# Patient Record
Sex: FEMALE | Race: WHITE | URBAN - METROPOLITAN AREA
[De-identification: names, ages, dates, MRNs, and addresses within clinical notes are randomized per-mention and may not be internally consistent; named-entity substitution may affect disease eponyms.]

---

## 2017-09-20 ENCOUNTER — OUTPATIENT (OUTPATIENT)
Dept: OUTPATIENT SERVICES | Facility: HOSPITAL | Age: 47
LOS: 1 days | Discharge: HOME | End: 2017-09-20

## 2017-09-20 DIAGNOSIS — R51 HEADACHE: ICD-10-CM

## 2017-09-20 DIAGNOSIS — E04.1 NONTOXIC SINGLE THYROID NODULE: ICD-10-CM

## 2017-09-20 DIAGNOSIS — E28.9 OVARIAN DYSFUNCTION, UNSPECIFIED: ICD-10-CM

## 2017-10-13 ENCOUNTER — OUTPATIENT (OUTPATIENT)
Dept: OUTPATIENT SERVICES | Facility: HOSPITAL | Age: 47
LOS: 1 days | Discharge: HOME | End: 2017-10-13

## 2017-10-13 DIAGNOSIS — Z00.00 ENCOUNTER FOR GENERAL ADULT MEDICAL EXAMINATION WITHOUT ABNORMAL FINDINGS: ICD-10-CM

## 2017-11-09 ENCOUNTER — OUTPATIENT (OUTPATIENT)
Dept: OUTPATIENT SERVICES | Facility: HOSPITAL | Age: 47
LOS: 1 days | Discharge: HOME | End: 2017-11-09

## 2017-11-10 DIAGNOSIS — G47.33 OBSTRUCTIVE SLEEP APNEA (ADULT) (PEDIATRIC): ICD-10-CM

## 2017-12-11 ENCOUNTER — OUTPATIENT (OUTPATIENT)
Dept: OUTPATIENT SERVICES | Facility: HOSPITAL | Age: 47
LOS: 1 days | Discharge: HOME | End: 2017-12-11

## 2017-12-11 DIAGNOSIS — Z12.31 ENCOUNTER FOR SCREENING MAMMOGRAM FOR MALIGNANT NEOPLASM OF BREAST: ICD-10-CM

## 2017-12-11 DIAGNOSIS — R92.2 INCONCLUSIVE MAMMOGRAM: ICD-10-CM

## 2017-12-21 ENCOUNTER — OUTPATIENT (OUTPATIENT)
Dept: OUTPATIENT SERVICES | Facility: HOSPITAL | Age: 47
LOS: 1 days | Discharge: HOME | End: 2017-12-21

## 2017-12-28 ENCOUNTER — OUTPATIENT (OUTPATIENT)
Dept: OUTPATIENT SERVICES | Facility: HOSPITAL | Age: 47
LOS: 1 days | Discharge: HOME | End: 2017-12-28

## 2017-12-28 DIAGNOSIS — D64.9 ANEMIA, UNSPECIFIED: ICD-10-CM

## 2018-01-04 DIAGNOSIS — N63.20 UNSPECIFIED LUMP IN THE LEFT BREAST, UNSPECIFIED QUADRANT: ICD-10-CM

## 2018-01-04 DIAGNOSIS — N60.32 FIBROSCLEROSIS OF LEFT BREAST: ICD-10-CM

## 2018-01-04 DIAGNOSIS — N60.22 FIBROADENOSIS OF LEFT BREAST: ICD-10-CM

## 2018-01-04 DIAGNOSIS — N62 HYPERTROPHY OF BREAST: ICD-10-CM

## 2018-01-11 ENCOUNTER — EMERGENCY (EMERGENCY)
Facility: HOSPITAL | Age: 48
LOS: 0 days | Discharge: HOME | End: 2018-01-11

## 2018-01-11 DIAGNOSIS — M54.2 CERVICALGIA: ICD-10-CM

## 2018-01-11 DIAGNOSIS — R07.89 OTHER CHEST PAIN: ICD-10-CM
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
## 2018-07-03 ENCOUNTER — OUTPATIENT (OUTPATIENT)
Dept: OUTPATIENT SERVICES | Facility: HOSPITAL | Age: 48
LOS: 1 days | Discharge: HOME | End: 2018-07-03

## 2018-07-03 DIAGNOSIS — R92.8 OTHER ABNORMAL AND INCONCLUSIVE FINDINGS ON DIAGNOSTIC IMAGING OF BREAST: ICD-10-CM

## 2018-09-12 ENCOUNTER — OUTPATIENT (OUTPATIENT)
Dept: OUTPATIENT SERVICES | Facility: HOSPITAL | Age: 48
LOS: 1 days | Discharge: HOME | End: 2018-09-12

## 2018-09-12 DIAGNOSIS — R07.9 CHEST PAIN, UNSPECIFIED: ICD-10-CM

## 2018-09-28 PROBLEM — Z00.00 ENCOUNTER FOR PREVENTIVE HEALTH EXAMINATION: Status: ACTIVE | Noted: 2018-09-28

## 2018-10-03 ENCOUNTER — APPOINTMENT (OUTPATIENT)
Dept: SURGERY | Facility: CLINIC | Age: 48
End: 2018-10-03
Payer: COMMERCIAL

## 2018-10-03 VITALS
BODY MASS INDEX: 43 KG/M2 | HEIGHT: 65.5 IN | DIASTOLIC BLOOD PRESSURE: 74 MMHG | SYSTOLIC BLOOD PRESSURE: 120 MMHG | WEIGHT: 261.2 LBS

## 2018-10-03 PROCEDURE — SI006: CPT

## 2018-11-02 ENCOUNTER — APPOINTMENT (OUTPATIENT)
Dept: SURGERY | Facility: CLINIC | Age: 48
End: 2018-11-02
Payer: COMMERCIAL

## 2018-11-02 VITALS
HEIGHT: 65.5 IN | BODY MASS INDEX: 43.95 KG/M2 | SYSTOLIC BLOOD PRESSURE: 120 MMHG | DIASTOLIC BLOOD PRESSURE: 78 MMHG | WEIGHT: 267 LBS

## 2018-11-02 DIAGNOSIS — Z87.19 PERSONAL HISTORY OF OTHER DISEASES OF THE DIGESTIVE SYSTEM: ICD-10-CM

## 2018-11-02 DIAGNOSIS — Z80.0 FAMILY HISTORY OF MALIGNANT NEOPLASM OF DIGESTIVE ORGANS: ICD-10-CM

## 2018-11-02 DIAGNOSIS — Z87.891 PERSONAL HISTORY OF NICOTINE DEPENDENCE: ICD-10-CM

## 2018-11-02 DIAGNOSIS — Z82.49 FAMILY HISTORY OF ISCHEMIC HEART DISEASE AND OTHER DISEASES OF THE CIRCULATORY SYSTEM: ICD-10-CM

## 2018-11-02 PROCEDURE — 99204 OFFICE O/P NEW MOD 45 MIN: CPT

## 2018-11-02 PROCEDURE — 99244 OFF/OP CNSLTJ NEW/EST MOD 40: CPT

## 2018-11-02 RX ORDER — OMEPRAZOLE 40 MG/1
40 CAPSULE, DELAYED RELEASE ORAL DAILY
Refills: 0 | Status: ACTIVE | COMMUNITY

## 2018-11-09 ENCOUNTER — OUTPATIENT (OUTPATIENT)
Dept: OUTPATIENT SERVICES | Facility: HOSPITAL | Age: 48
LOS: 1 days | Discharge: HOME | End: 2018-11-09

## 2018-11-09 DIAGNOSIS — K21.0 GASTRO-ESOPHAGEAL REFLUX DISEASE WITH ESOPHAGITIS: ICD-10-CM

## 2018-11-19 ENCOUNTER — APPOINTMENT (OUTPATIENT)
Dept: SURGERY | Facility: CLINIC | Age: 48
End: 2018-11-19
Payer: COMMERCIAL

## 2018-11-19 VITALS — HEIGHT: 65.5 IN | BODY MASS INDEX: 44.11 KG/M2 | WEIGHT: 268 LBS

## 2018-11-19 DIAGNOSIS — J32.9 CHRONIC SINUSITIS, UNSPECIFIED: ICD-10-CM

## 2018-11-19 PROCEDURE — 97802 MEDICAL NUTRITION INDIV IN: CPT

## 2018-11-26 ENCOUNTER — OUTPATIENT (OUTPATIENT)
Dept: OUTPATIENT SERVICES | Facility: HOSPITAL | Age: 48
LOS: 1 days | Discharge: HOME | End: 2018-11-26

## 2018-11-26 DIAGNOSIS — Z00.00 ENCOUNTER FOR GENERAL ADULT MEDICAL EXAMINATION WITHOUT ABNORMAL FINDINGS: ICD-10-CM

## 2018-12-17 ENCOUNTER — APPOINTMENT (OUTPATIENT)
Dept: SURGERY | Facility: CLINIC | Age: 48
End: 2018-12-17
Payer: COMMERCIAL

## 2018-12-17 VITALS — HEIGHT: 65.5 IN | WEIGHT: 264.4 LBS | BODY MASS INDEX: 43.52 KG/M2

## 2018-12-17 PROCEDURE — 99212 OFFICE O/P EST SF 10 MIN: CPT

## 2019-01-03 ENCOUNTER — TRANSCRIPTION ENCOUNTER (OUTPATIENT)
Age: 49
End: 2019-01-03

## 2019-01-24 ENCOUNTER — APPOINTMENT (OUTPATIENT)
Dept: SURGERY | Facility: CLINIC | Age: 49
End: 2019-01-24
Payer: COMMERCIAL

## 2019-01-24 VITALS — BODY MASS INDEX: 44.44 KG/M2 | HEIGHT: 65.5 IN | WEIGHT: 270 LBS

## 2019-01-24 PROCEDURE — 99212 OFFICE O/P EST SF 10 MIN: CPT

## 2019-02-19 ENCOUNTER — APPOINTMENT (OUTPATIENT)
Dept: SURGERY | Facility: CLINIC | Age: 49
End: 2019-02-19

## 2019-03-21 ENCOUNTER — APPOINTMENT (OUTPATIENT)
Dept: SURGERY | Facility: CLINIC | Age: 49
End: 2019-03-21
Payer: COMMERCIAL

## 2019-03-21 VITALS — WEIGHT: 256 LBS | BODY MASS INDEX: 42.14 KG/M2 | HEIGHT: 65.5 IN

## 2019-03-21 PROCEDURE — 99212 OFFICE O/P EST SF 10 MIN: CPT

## 2019-04-25 ENCOUNTER — APPOINTMENT (OUTPATIENT)
Dept: SURGERY | Facility: CLINIC | Age: 49
End: 2019-04-25
Payer: COMMERCIAL

## 2019-04-25 VITALS — BODY MASS INDEX: 41.32 KG/M2 | WEIGHT: 251 LBS | HEIGHT: 65.5 IN

## 2019-04-25 DIAGNOSIS — K51.20 ULCERATIVE (CHRONIC) PROCTITIS W/OUT COMPLICATIONS: ICD-10-CM

## 2019-04-25 PROCEDURE — 99212 OFFICE O/P EST SF 10 MIN: CPT

## 2019-05-14 ENCOUNTER — APPOINTMENT (OUTPATIENT)
Dept: SURGERY | Facility: CLINIC | Age: 49
End: 2019-05-14
Payer: COMMERCIAL

## 2019-05-14 VITALS — HEIGHT: 65.5 IN | BODY MASS INDEX: 41.32 KG/M2 | WEIGHT: 251 LBS

## 2019-05-14 DIAGNOSIS — M25.562 PAIN IN LEFT KNEE: ICD-10-CM

## 2019-05-14 DIAGNOSIS — M25.471 EFFUSION, RIGHT ANKLE: ICD-10-CM

## 2019-05-14 DIAGNOSIS — M25.472 EFFUSION, RIGHT ANKLE: ICD-10-CM

## 2019-05-14 DIAGNOSIS — K21.0 GASTRO-ESOPHAGEAL REFLUX DISEASE WITH ESOPHAGITIS: ICD-10-CM

## 2019-05-14 DIAGNOSIS — G47.33 OBSTRUCTIVE SLEEP APNEA (ADULT) (PEDIATRIC): ICD-10-CM

## 2019-05-14 PROCEDURE — 99212 OFFICE O/P EST SF 10 MIN: CPT

## 2019-06-11 ENCOUNTER — APPOINTMENT (OUTPATIENT)
Dept: SURGERY | Facility: CLINIC | Age: 49
End: 2019-06-11

## 2019-07-11 ENCOUNTER — OTHER (OUTPATIENT)
Age: 49
End: 2019-07-11

## 2019-07-24 ENCOUNTER — OTHER (OUTPATIENT)
Age: 49
End: 2019-07-24

## 2019-09-26 ENCOUNTER — OUTPATIENT (OUTPATIENT)
Dept: OUTPATIENT SERVICES | Facility: HOSPITAL | Age: 49
LOS: 1 days | Discharge: HOME | End: 2019-09-26
Payer: COMMERCIAL

## 2019-09-26 DIAGNOSIS — Z12.31 ENCOUNTER FOR SCREENING MAMMOGRAM FOR MALIGNANT NEOPLASM OF BREAST: ICD-10-CM

## 2019-09-26 PROCEDURE — 77063 BREAST TOMOSYNTHESIS BI: CPT | Mod: 26

## 2019-09-26 PROCEDURE — 77067 SCR MAMMO BI INCL CAD: CPT | Mod: 26

## 2020-04-26 ENCOUNTER — MESSAGE (OUTPATIENT)
Age: 50
End: 2020-04-26

## 2020-05-05 ENCOUNTER — APPOINTMENT (OUTPATIENT)
Dept: DISASTER EMERGENCY | Facility: CLINIC | Age: 50
End: 2020-05-05

## 2020-05-09 LAB
SARS-COV-2 IGG SERPL IA-ACNC: <0.1 INDEX
SARS-COV-2 IGG SERPL QL IA: NEGATIVE

## 2020-07-20 ENCOUNTER — TRANSCRIPTION ENCOUNTER (OUTPATIENT)
Age: 50
End: 2020-07-20

## 2020-08-01 PROBLEM — Z00.00 ENCOUNTER FOR PREVENTIVE HEALTH EXAMINATION: Status: ACTIVE | Noted: 2020-08-01

## 2020-08-04 ENCOUNTER — RESULT REVIEW (OUTPATIENT)
Age: 50
End: 2020-08-04

## 2020-08-04 ENCOUNTER — OUTPATIENT (OUTPATIENT)
Dept: OUTPATIENT SERVICES | Facility: HOSPITAL | Age: 50
LOS: 1 days | Discharge: HOME | End: 2020-08-04
Payer: COMMERCIAL

## 2020-08-04 DIAGNOSIS — M79.671 PAIN IN RIGHT FOOT: ICD-10-CM

## 2020-08-04 PROCEDURE — 73718 MRI LOWER EXTREMITY W/O DYE: CPT | Mod: 26,RT

## 2021-05-14 ENCOUNTER — OUTPATIENT (OUTPATIENT)
Dept: OUTPATIENT SERVICES | Facility: HOSPITAL | Age: 51
LOS: 1 days | Discharge: HOME | End: 2021-05-14
Payer: COMMERCIAL

## 2021-05-14 DIAGNOSIS — Z12.31 ENCOUNTER FOR SCREENING MAMMOGRAM FOR MALIGNANT NEOPLASM OF BREAST: ICD-10-CM

## 2021-05-14 PROCEDURE — 77063 BREAST TOMOSYNTHESIS BI: CPT | Mod: 26

## 2021-05-14 PROCEDURE — 77067 SCR MAMMO BI INCL CAD: CPT | Mod: 26

## 2021-08-04 ENCOUNTER — APPOINTMENT (OUTPATIENT)
Dept: SURGERY | Facility: CLINIC | Age: 51
End: 2021-08-04
Payer: COMMERCIAL

## 2021-08-04 VITALS
SYSTOLIC BLOOD PRESSURE: 124 MMHG | HEART RATE: 92 BPM | BODY MASS INDEX: 44.08 KG/M2 | TEMPERATURE: 97.3 F | DIASTOLIC BLOOD PRESSURE: 82 MMHG | OXYGEN SATURATION: 98 % | HEIGHT: 65.5 IN | WEIGHT: 267.8 LBS

## 2021-08-04 DIAGNOSIS — K21.9 GASTRO-ESOPHAGEAL REFLUX DISEASE W/OUT ESOPHAGITIS: ICD-10-CM

## 2021-08-04 PROCEDURE — 99214 OFFICE O/P EST MOD 30 MIN: CPT

## 2021-08-04 RX ORDER — SUCRALFATE 1 G/10ML
1 SUSPENSION ORAL 4 TIMES DAILY
Refills: 0 | Status: DISCONTINUED | COMMUNITY
End: 2021-08-04

## 2021-08-04 RX ORDER — MESALAMINE 375 MG/1
CAPSULE, EXTENDED RELEASE ORAL DAILY
Refills: 0 | Status: DISCONTINUED | COMMUNITY
End: 2021-08-04

## 2021-08-04 NOTE — ASSESSMENT
[FreeTextEntry1] : 50 y/o female with Class 3 obesity interested in laparoscopic sleeve gastrectomy.

## 2021-08-04 NOTE — HISTORY OF PRESENT ILLNESS
[de-identified] : Ms. LINDA BAUTISTA is a pleasant 51 year year old female who has been struggling with Her weight for many years.    Ms. LINDA BAUTISTA has tried countless diet and exercise programs, but has been unable to lose sufficient weight and keep it off.  She is here today to discuss surgical options for weight loss.\par \par She is frustrated and ready to learn more about bariatric surgery.  She started the program a few years ago but wanted to give diet and exercise another try.  She has lost 15-20 pounds every year for the past 15 years but has been unable to keep it off each time.  \par \par She works as a surgical PA and gets home late 3 nights a week.  She acknowledges that those nights she eats too much too late.  She also knows that her GERD symptoms are a results of her drinking more coffee lately.\par \par She has ulcerative proctitis and is concerned about effects of bariatric surgery, if any, for her with that condition. \par

## 2021-08-04 NOTE — PLAN
[FreeTextEntry1] : Ms. LINDA BAUTISTA is a 51 year year old female with obesity who is interested in undergoing laparoscopic sleeve gastrectomy for weight loss.  We discussed in detail how Bariatric Surgery is a tool to help treat the serious disease of obesity, and that Her compliance with diet, exercise, and follow-up is crucial to Her overall safety and success.  She will require the following pre-operative evaluations and testing:\par \par Lab work\par Psychological evaluation\par Diet history\par Cardiology evaluation\par Pulmonology evaluation, including sleep study\par Gastroenterology evaluation for upper endoscopy\par PMD clearance\par Nutritional evaluation\par Attendance at preoperative support groups\par \par We had a discussion about potential post-operative complications, including but not limited to: bleeding, infection, leak, stricture, blood clots, GERD, problems with anesthesia.  The patient understands and wishes to proceed.\par \par We discussed that smoking of any kind will preclude the patient from being able to have bariatric surgery.\par \par Will speak with her gastroenterologist about her ulcerative proctitis and obtain clearance from him before proceeding, although there should not be any contraindications that I can foresee for the sleeve. \par \par

## 2021-09-13 ENCOUNTER — APPOINTMENT (OUTPATIENT)
Dept: SURGERY | Facility: CLINIC | Age: 51
End: 2021-09-13
Payer: COMMERCIAL

## 2021-09-13 VITALS — BODY MASS INDEX: 44.28 KG/M2 | HEIGHT: 65.5 IN | WEIGHT: 269 LBS

## 2021-09-13 PROCEDURE — ZZZZZ: CPT

## 2021-10-18 ENCOUNTER — APPOINTMENT (OUTPATIENT)
Dept: SURGERY | Facility: CLINIC | Age: 51
End: 2021-10-18
Payer: COMMERCIAL

## 2021-10-18 VITALS — BODY MASS INDEX: 43.62 KG/M2 | HEIGHT: 65.5 IN | WEIGHT: 265 LBS

## 2021-10-18 PROCEDURE — ZZZZZ: CPT

## 2021-11-04 ENCOUNTER — APPOINTMENT (OUTPATIENT)
Dept: SURGERY | Facility: CLINIC | Age: 51
End: 2021-11-04

## 2021-11-10 ENCOUNTER — NON-APPOINTMENT (OUTPATIENT)
Age: 51
End: 2021-11-10

## 2021-11-22 ENCOUNTER — APPOINTMENT (OUTPATIENT)
Dept: SURGERY | Facility: CLINIC | Age: 51
End: 2021-11-22
Payer: COMMERCIAL

## 2021-11-22 ENCOUNTER — NON-APPOINTMENT (OUTPATIENT)
Age: 51
End: 2021-11-22

## 2021-11-22 VITALS — WEIGHT: 267 LBS | BODY MASS INDEX: 43.95 KG/M2 | HEIGHT: 65.5 IN

## 2021-11-22 DIAGNOSIS — E66.01 MORBID (SEVERE) OBESITY DUE TO EXCESS CALORIES: ICD-10-CM

## 2021-11-22 PROCEDURE — ZZZZZ: CPT

## 2021-11-29 ENCOUNTER — APPOINTMENT (OUTPATIENT)
Dept: NEUROPSYCHOLOGY | Facility: CLINIC | Age: 51
End: 2021-11-29

## 2021-12-07 ENCOUNTER — APPOINTMENT (OUTPATIENT)
Dept: NEUROPSYCHOLOGY | Facility: CLINIC | Age: 51
End: 2021-12-07

## 2021-12-07 ENCOUNTER — OUTPATIENT (OUTPATIENT)
Dept: OUTPATIENT SERVICES | Facility: HOSPITAL | Age: 51
LOS: 1 days | Discharge: HOME | End: 2021-12-07

## 2021-12-07 DIAGNOSIS — F50.9 EATING DISORDER, UNSPECIFIED: ICD-10-CM

## 2021-12-08 DIAGNOSIS — F50.9 EATING DISORDER, UNSPECIFIED: ICD-10-CM

## 2022-07-19 ENCOUNTER — OUTPATIENT (OUTPATIENT)
Dept: OUTPATIENT SERVICES | Facility: HOSPITAL | Age: 52
LOS: 1 days | Discharge: HOME | End: 2022-07-19

## 2022-07-19 DIAGNOSIS — Z12.31 ENCOUNTER FOR SCREENING MAMMOGRAM FOR MALIGNANT NEOPLASM OF BREAST: ICD-10-CM

## 2022-07-19 PROCEDURE — 77063 BREAST TOMOSYNTHESIS BI: CPT | Mod: 26

## 2022-07-19 PROCEDURE — 77067 SCR MAMMO BI INCL CAD: CPT | Mod: 26

## 2022-08-15 ENCOUNTER — APPOINTMENT (OUTPATIENT)
Dept: PULMONOLOGY | Facility: CLINIC | Age: 52
End: 2022-08-15

## 2022-08-15 VITALS
OXYGEN SATURATION: 97 % | DIASTOLIC BLOOD PRESSURE: 72 MMHG | WEIGHT: 273 LBS | HEART RATE: 75 BPM | RESPIRATION RATE: 14 BRPM | SYSTOLIC BLOOD PRESSURE: 114 MMHG | HEIGHT: 65.5 IN | BODY MASS INDEX: 44.94 KG/M2

## 2022-08-15 DIAGNOSIS — G47.33 OBSTRUCTIVE SLEEP APNEA (ADULT) (PEDIATRIC): ICD-10-CM

## 2022-08-15 DIAGNOSIS — R06.02 SHORTNESS OF BREATH: ICD-10-CM

## 2022-08-15 PROCEDURE — 99214 OFFICE O/P EST MOD 30 MIN: CPT

## 2022-08-15 NOTE — REASON FOR VISIT
[Follow-Up] : a follow-up visit [[id number]] : Patient was seen very many years ago.  She has a history of obstructive sleep apnea syndrome.  She tried the machine but gave it back not been able to tolerate the CPAP.  CPAP severity was very mild at that point.  She is planning on undergoing bariatric surgery but for many reasons that fell through.  She is now looking to start of the process again and  tentatively has a procedure scheduled for mid October.\par \par Patient had COVID several months ago.  She feels a little rundown and tired because of this.  Seems to be predominantly upper respiratory and never affected her lungs.  She has gained a little bit of weight over the past several years and is leading a relatively sedentary lifestyle.  She feels a little more short of breath than she did several years ago

## 2022-08-15 NOTE — ASSESSMENT
[FreeTextEntry1] : I believe the patient has very mild obstructive sleep apnea.  I do not think she needs repeat sleep testing prior as this level of sleep apnea should not cause postoperative issues.\par I will arrange for a chest x-ray and full pulmonary function testing.  This is more precautionary as I feel her dyspnea is related to weight gain and deconditioning.\par I will review these findings prior to her surgical procedure.\par I believe the patient would be a mild pulmonary risk for the planned bariatric procedure.

## 2022-09-15 ENCOUNTER — RESULT REVIEW (OUTPATIENT)
Age: 52
End: 2022-09-15

## 2022-09-15 ENCOUNTER — OUTPATIENT (OUTPATIENT)
Dept: OUTPATIENT SERVICES | Facility: HOSPITAL | Age: 52
LOS: 1 days | Discharge: HOME | End: 2022-09-15

## 2022-09-15 DIAGNOSIS — R06.00 DYSPNEA, UNSPECIFIED: ICD-10-CM

## 2022-09-15 PROCEDURE — 71046 X-RAY EXAM CHEST 2 VIEWS: CPT | Mod: 26

## 2022-09-20 ENCOUNTER — NON-APPOINTMENT (OUTPATIENT)
Age: 52
End: 2022-09-20

## 2022-09-28 ENCOUNTER — OUTPATIENT (OUTPATIENT)
Dept: OUTPATIENT SERVICES | Facility: HOSPITAL | Age: 52
LOS: 1 days | End: 2022-09-28

## 2022-09-28 ENCOUNTER — APPOINTMENT (OUTPATIENT)
Dept: OPHTHALMOLOGY | Facility: CLINIC | Age: 52
End: 2022-09-28

## 2022-09-30 DIAGNOSIS — H18.459 NODULAR CORNEAL DEGENERATION, UNSPECIFIED EYE: ICD-10-CM

## 2022-10-05 ENCOUNTER — OUTPATIENT (OUTPATIENT)
Dept: OUTPATIENT SERVICES | Facility: HOSPITAL | Age: 52
LOS: 1 days | Discharge: HOME | End: 2022-10-05

## 2022-10-05 DIAGNOSIS — G47.33 OBSTRUCTIVE SLEEP APNEA (ADULT) (PEDIATRIC): ICD-10-CM

## 2022-10-05 DIAGNOSIS — R06.02 SHORTNESS OF BREATH: ICD-10-CM

## 2022-10-05 PROCEDURE — 94060 EVALUATION OF WHEEZING: CPT | Mod: 26

## 2022-10-05 PROCEDURE — 94729 DIFFUSING CAPACITY: CPT | Mod: 26

## 2022-10-05 PROCEDURE — 94727 GAS DIL/WSHOT DETER LNG VOL: CPT | Mod: 26
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
## 2024-04-25 ENCOUNTER — OUTPATIENT (OUTPATIENT)
Dept: OUTPATIENT SERVICES | Facility: HOSPITAL | Age: 54
LOS: 1 days | End: 2024-04-25
Payer: COMMERCIAL

## 2024-04-25 DIAGNOSIS — Z12.31 ENCOUNTER FOR SCREENING MAMMOGRAM FOR MALIGNANT NEOPLASM OF BREAST: ICD-10-CM

## 2024-04-25 PROCEDURE — 77067 SCR MAMMO BI INCL CAD: CPT | Mod: 26

## 2024-04-25 PROCEDURE — 77063 BREAST TOMOSYNTHESIS BI: CPT

## 2024-04-25 PROCEDURE — 77063 BREAST TOMOSYNTHESIS BI: CPT | Mod: 26

## 2024-04-25 PROCEDURE — 77067 SCR MAMMO BI INCL CAD: CPT

## 2024-04-26 DIAGNOSIS — Z12.31 ENCOUNTER FOR SCREENING MAMMOGRAM FOR MALIGNANT NEOPLASM OF BREAST: ICD-10-CM

## 2025-04-30 ENCOUNTER — EMERGENCY (EMERGENCY)
Facility: HOSPITAL | Age: 55
LOS: 0 days | Discharge: HOME | End: 2025-04-30
Attending: EMERGENCY MEDICINE
Payer: COMMERCIAL

## 2025-04-30 VITALS
TEMPERATURE: 98 F | RESPIRATION RATE: 16 BRPM | OXYGEN SATURATION: 98 % | DIASTOLIC BLOOD PRESSURE: 78 MMHG | WEIGHT: 227.08 LBS | SYSTOLIC BLOOD PRESSURE: 113 MMHG | HEART RATE: 83 BPM

## 2025-04-30 DIAGNOSIS — R11.2 NAUSEA WITH VOMITING, UNSPECIFIED: ICD-10-CM

## 2025-04-30 DIAGNOSIS — R10.13 EPIGASTRIC PAIN: ICD-10-CM

## 2025-04-30 DIAGNOSIS — R74.01 ELEVATION OF LEVELS OF LIVER TRANSAMINASE LEVELS: ICD-10-CM

## 2025-04-30 DIAGNOSIS — Z90.3 ACQUIRED ABSENCE OF STOMACH [PART OF]: Chronic | ICD-10-CM

## 2025-04-30 DIAGNOSIS — R50.9 FEVER, UNSPECIFIED: ICD-10-CM

## 2025-04-30 LAB
ALBUMIN SERPL ELPH-MCNC: 4.1 G/DL — SIGNIFICANT CHANGE UP (ref 3.5–5.2)
ALP SERPL-CCNC: 230 U/L — HIGH (ref 30–115)
ALT FLD-CCNC: 159 U/L — HIGH (ref 0–41)
ANION GAP SERPL CALC-SCNC: 11 MMOL/L — SIGNIFICANT CHANGE UP (ref 7–14)
AST SERPL-CCNC: 297 U/L — HIGH (ref 0–41)
BILIRUB SERPL-MCNC: 1.4 MG/DL — HIGH (ref 0.2–1.2)
BUN SERPL-MCNC: 12 MG/DL — SIGNIFICANT CHANGE UP (ref 10–20)
CALCIUM SERPL-MCNC: 8.9 MG/DL — SIGNIFICANT CHANGE UP (ref 8.4–10.5)
CHLORIDE SERPL-SCNC: 103 MMOL/L — SIGNIFICANT CHANGE UP (ref 98–110)
CO2 SERPL-SCNC: 26 MMOL/L — SIGNIFICANT CHANGE UP (ref 17–32)
CREAT SERPL-MCNC: 0.9 MG/DL — SIGNIFICANT CHANGE UP (ref 0.7–1.5)
EGFR: 76 ML/MIN/1.73M2 — SIGNIFICANT CHANGE UP
EGFR: 76 ML/MIN/1.73M2 — SIGNIFICANT CHANGE UP
GLUCOSE SERPL-MCNC: 77 MG/DL — SIGNIFICANT CHANGE UP (ref 70–99)
HCG SERPL QL: NEGATIVE — SIGNIFICANT CHANGE UP
HCT VFR BLD CALC: 50.4 % — HIGH (ref 37–47)
HGB BLD-MCNC: 16 G/DL — SIGNIFICANT CHANGE UP (ref 12–16)
LIDOCAIN IGE QN: 28 U/L — SIGNIFICANT CHANGE UP (ref 7–60)
MCHC RBC-ENTMCNC: 29.6 PG — SIGNIFICANT CHANGE UP (ref 27–31)
MCHC RBC-ENTMCNC: 31.7 G/DL — LOW (ref 32–37)
MCV RBC AUTO: 93.2 FL — SIGNIFICANT CHANGE UP (ref 81–99)
NRBC BLD AUTO-RTO: 0 /100 WBCS — SIGNIFICANT CHANGE UP (ref 0–0)
PLATELET # BLD AUTO: 224 K/UL — SIGNIFICANT CHANGE UP (ref 130–400)
PMV BLD: 10.9 FL — HIGH (ref 7.4–10.4)
POTASSIUM SERPL-MCNC: 4.8 MMOL/L — SIGNIFICANT CHANGE UP (ref 3.5–5)
POTASSIUM SERPL-SCNC: 4.8 MMOL/L — SIGNIFICANT CHANGE UP (ref 3.5–5)
PROT SERPL-MCNC: 6.8 G/DL — SIGNIFICANT CHANGE UP (ref 6–8)
RBC # BLD: 5.41 M/UL — HIGH (ref 4.2–5.4)
RBC # FLD: 14.2 % — SIGNIFICANT CHANGE UP (ref 11.5–14.5)
SODIUM SERPL-SCNC: 140 MMOL/L — SIGNIFICANT CHANGE UP (ref 135–146)
WBC # BLD: 13.34 K/UL — HIGH (ref 4.8–10.8)
WBC # FLD AUTO: 13.34 K/UL — HIGH (ref 4.8–10.8)

## 2025-04-30 PROCEDURE — 36415 COLL VENOUS BLD VENIPUNCTURE: CPT

## 2025-04-30 PROCEDURE — 84703 CHORIONIC GONADOTROPIN ASSAY: CPT

## 2025-04-30 PROCEDURE — 74177 CT ABD & PELVIS W/CONTRAST: CPT | Mod: 26

## 2025-04-30 PROCEDURE — 93010 ELECTROCARDIOGRAM REPORT: CPT

## 2025-04-30 PROCEDURE — 80053 COMPREHEN METABOLIC PANEL: CPT

## 2025-04-30 PROCEDURE — 93005 ELECTROCARDIOGRAM TRACING: CPT

## 2025-04-30 PROCEDURE — 36000 PLACE NEEDLE IN VEIN: CPT | Mod: XU

## 2025-04-30 PROCEDURE — 85027 COMPLETE CBC AUTOMATED: CPT

## 2025-04-30 PROCEDURE — 76705 ECHO EXAM OF ABDOMEN: CPT

## 2025-04-30 PROCEDURE — 74177 CT ABD & PELVIS W/CONTRAST: CPT | Mod: MC

## 2025-04-30 PROCEDURE — 99285 EMERGENCY DEPT VISIT HI MDM: CPT | Mod: 25

## 2025-04-30 PROCEDURE — 76705 ECHO EXAM OF ABDOMEN: CPT | Mod: 26

## 2025-04-30 PROCEDURE — 83690 ASSAY OF LIPASE: CPT

## 2025-04-30 PROCEDURE — 99285 EMERGENCY DEPT VISIT HI MDM: CPT

## 2025-04-30 RX ORDER — AMOXICILLIN AND CLAVULANATE POTASSIUM 500; 125 MG/1; MG/1
1 TABLET, FILM COATED ORAL
Qty: 20 | Refills: 0
Start: 2025-04-30 | End: 2025-05-09

## 2025-04-30 RX ORDER — DIPHENHYDRAMINE HYDROCHLORIDE AND LIDOCAINE HYDROCHLORIDE AND ALUMINUM HYDROXIDE AND MAGNESIUM HYDRO
30 KIT ONCE
Refills: 0 | Status: COMPLETED | OUTPATIENT
Start: 2025-04-30 | End: 2025-04-30

## 2025-04-30 RX ADMIN — DIPHENHYDRAMINE HYDROCHLORIDE AND LIDOCAINE HYDROCHLORIDE AND ALUMINUM HYDROXIDE AND MAGNESIUM HYDRO 30 MILLILITER(S): KIT at 10:05

## 2025-04-30 RX ADMIN — Medication 40 MILLIGRAM(S): at 08:45

## 2025-04-30 NOTE — ED PROVIDER NOTE - CARE PROVIDER_API CALL
Deep Shepard  Surgery  [address] [place] [address], [address]  [place] [address]  Phone: [phone]  Fax: [phone]  Follow Up Time:

## 2025-04-30 NOTE — ED ADULT TRIAGE NOTE - CHIEF COMPLAINT QUOTE
pt in er for complaints of mid epigastric pain that started during the night after eating around 9 pm, pt vomited twice between 3 and 4 am  pt was able to fall asleep, pain returned this morning and has been constant   pt recently started weight loss medication, history of gastric sleeve

## 2025-04-30 NOTE — ED PROVIDER NOTE - NSFOLLOWUPINSTRUCTIONS_ED_ALL_ED_FT
SEE YOUR DOCTOR IN THE NEXT 24-48 HOURS   RETURN FOR ANY FURTHER CONCERNS     Abdominal Pain    Many things can cause abdominal pain. Many times, abdominal pain is not caused by a disease and will improve without treatment. Your health care provider will do a physical exam to determine if there is a dangerous cause of your pain; blood tests and imaging may help determine the cause of your pain. However, in many cases, no cause may be found and you may need further testing as an outpatient. Monitor your abdominal pain for any changes.     SEEK IMMEDIATE MEDICAL CARE IF YOU HAVE ANY OF THE FOLLOWING SYMPTOMS: worsening abdominal pain, uncontrollable vomiting, profuse diarrhea, inability to have bowel movements or pass gas, black or bloody stools, fever accompanying chest pain or back pain, or fainting. These symptoms may represent a serious problem that is an emergency. Do not wait to see if the symptoms will go away. Get medical help right away. Call 911 and do not drive yourself to the hospital.

## 2025-04-30 NOTE — ED PROVIDER NOTE - PHYSICAL EXAMINATION
VITAL SIGNS: I have reviewed nursing notes and confirm.  CONSTITUTIONAL: Well-developed; well-nourished  SKIN: skin exam is warm and dry, no acute rash.    HEAD: Normocephalic; atraumatic.  EYES: conjunctiva and sclera clear.  ENT: No nasal discharge; airway clear.  CARD: S1, S2 normal; no murmurs, gallops, or rubs. Regular rate and rhythm.   RESP: No wheezes, rales or rhonchi.  ABD: Normal bowel sounds; soft; non-distended  EXT: Normal ROM.  No clubbing, cyanosis or edema.   NEURO: Alert, oriented, grossly unremarkable

## 2025-04-30 NOTE — ED PROVIDER NOTE - CLINICAL SUMMARY MEDICAL DECISION MAKING FREE TEXT BOX
Patient is a 55-year-old female presents for evaluation of epigastric abdominal pain onset over the past 24 to 36 hours with associated fevers chills vomiting or diarrhea patient happens to be on Zepbound denies chest pain shortness of breath diaphoresis    On physical exam patient is normocephalic atraumatic pupils equal round reactive light accommodation extraocular muscles intact oropharynx clear chest clear to station bilaterally abdomen mild tenderness to palpation epigastric region no guarding no rebound extremities full range of motion radial pulses 2+ pedal pulse 2+ no focal deficits noted    Assessment plan patient presents for evaluation of epigastric abdominal pain we obtained labs patient found to have elevated LFTs at which point we obtained CT abdomen pelvis which reveals evidence of cholecystitis right upper quadrant ultrasound also obtained which is negative I obtain EKG provide pain evaluation not consistent with STEMI no excessive QT prolongation not consistent with arrhythmia in addition patient given IV fluids Pepcid as well as Magic mouthwash initially improved given CT findings consulted surgery patient opted to be discharged for outpatient management we discussed indications to return patient is aware

## 2025-04-30 NOTE — ED ADULT TRIAGE NOTE - BP NONINVASIVE DIASTOLIC (MM HG)
[address] [facility] [phone]  [address] [facility] [address] [facility] [phone]    Pacemaker/Defibrillator Clinic    07/07/21      [address] [facility]  [address] [facility]      Dear [address] [facility]    This letter is to inform you that we received the transmission from your monitor at home that checks your implanted heart device. The next date your monitor will automatically transmit will be 10/26. If your report needs attention we will notify you. Your device and monitor are wireless and most transmit cellularly, but please periodically check your monitor is still plugged in to the electrical outlet. If you still use the telephone land line to send please ensure the connection to the phone [email or web address] is secure. This will help to ensure successful automatic transmissions in the future. Also, the monitor needs to be close to you while sleeping at night. Please be aware that the remote device transmission sites are periodically monitored only during regular business hours during which simultaneous in-office device clinics are being run. If your transmission requires attention, we will contact you as soon as possible. **PLEASE NOTE** that our [facility] policy and processes are changing to ensure a more seamless approach for all parties involved, allowing more time for our nurses to address patient issues and concerns. We will no longer be sending letters for NORMAL remote transmissions. You will be contacted by phone if your transmission requires attention (as previously done), and letters will only be sent regarding monitor disconnections or missed transmissions if you are unable to be reached by phone. Please do not be alarmed by this new process, as we will continue to contact you if your transmission report requires attention. This will be your final \"remote received\" letter.   From this point forward, the [facility] will be utilizing the no news is good news approach. As always, please feel free to contact your nurse with any questions or concerns. Thank you.     [facility] [address]

## 2025-04-30 NOTE — ED PROVIDER NOTE - PATIENT PORTAL LINK FT
You can access the FollowMyHealth Patient Portal offered by [facility] by registering at the following website: [email or web address]. By joining [facility]’s FollowMyHealth portal, you will also be able to view your health information using other applications (apps) compatible with our system.

## 2025-04-30 NOTE — ED PROVIDER NOTE - OBJECTIVE STATEMENT
Patient is 55-year-old female recently started on Zepbound here for evaluation of nausea abdominal discomfort.  Patient denies fever, chills vomiting

## 2025-04-30 NOTE — ED PROVIDER NOTE - ATTENDING APP SHARED VISIT CONTRIBUTION OF CARE
I have personally performed a history and physical exam on this patient and personally directed the management of the patient. Patient is a 55-year-old female presents for evaluation of epigastric abdominal pain onset over the past 24 to 36 hours with associated fevers chills vomiting or diarrhea patient happens to be on Zepbound denies chest pain shortness of breath diaphoresis    On physical exam patient is normocephalic atraumatic pupils equal round reactive light accommodation extraocular muscles intact oropharynx clear chest clear to station bilaterally abdomen mild tenderness to palpation epigastric region no guarding no rebound extremities full range of motion radial pulses 2+ pedal pulse 2+ no focal deficits noted    Assessment plan patient presents for evaluation of epigastric abdominal pain we obtained labs patient found to have elevated LFTs at which point we obtained CT abdomen pelvis which reveals evidence of cholecystitis right upper quadrant ultrasound also obtained which is negative I obtain EKG provide pain evaluation not consistent with STEMI no excessive QT prolongation not consistent with arrhythmia in addition patient given IV fluids Pepcid as well as Magic mouthwash initially improved given CT findings consulted surgery patient opted to be discharged for outpatient management we discussed indications to return patient is aware

## 2025-06-19 ENCOUNTER — INPATIENT (INPATIENT)
Facility: HOSPITAL | Age: 55
LOS: 0 days | Discharge: HOME | DRG: 419 | End: 2025-06-20
Attending: SURGERY | Admitting: SURGERY
Payer: COMMERCIAL

## 2025-06-19 VITALS
RESPIRATION RATE: 18 BRPM | DIASTOLIC BLOOD PRESSURE: 79 MMHG | SYSTOLIC BLOOD PRESSURE: 130 MMHG | OXYGEN SATURATION: 98 % | TEMPERATURE: 98 F | WEIGHT: 225.09 LBS | HEART RATE: 54 BPM

## 2025-06-19 DIAGNOSIS — Z90.3 ACQUIRED ABSENCE OF STOMACH [PART OF]: Chronic | ICD-10-CM

## 2025-06-19 DIAGNOSIS — K80.50 CALCULUS OF BILE DUCT WITHOUT CHOLANGITIS OR CHOLECYSTITIS WITHOUT OBSTRUCTION: ICD-10-CM

## 2025-06-19 LAB
ALBUMIN SERPL ELPH-MCNC: 4.2 G/DL — SIGNIFICANT CHANGE UP (ref 3.5–5.2)
ALP SERPL-CCNC: 173 U/L — HIGH (ref 30–115)
ALT FLD-CCNC: 26 U/L — SIGNIFICANT CHANGE UP (ref 0–41)
ANION GAP SERPL CALC-SCNC: 11 MMOL/L — SIGNIFICANT CHANGE UP (ref 7–14)
ANION GAP SERPL CALC-SCNC: 12 MMOL/L — SIGNIFICANT CHANGE UP (ref 7–14)
AST SERPL-CCNC: 72 U/L — HIGH (ref 0–41)
BASOPHILS # BLD AUTO: 0.06 K/UL — SIGNIFICANT CHANGE UP (ref 0–0.2)
BASOPHILS NFR BLD AUTO: 0.7 % — SIGNIFICANT CHANGE UP (ref 0–2)
BILIRUB SERPL-MCNC: 0.9 MG/DL — SIGNIFICANT CHANGE UP (ref 0.2–1.2)
BUN SERPL-MCNC: 11 MG/DL — SIGNIFICANT CHANGE UP (ref 10–20)
BUN SERPL-MCNC: 14 MG/DL — SIGNIFICANT CHANGE UP (ref 10–20)
CALCIUM SERPL-MCNC: 8.5 MG/DL — SIGNIFICANT CHANGE UP (ref 8.4–10.5)
CALCIUM SERPL-MCNC: 9.1 MG/DL — SIGNIFICANT CHANGE UP (ref 8.4–10.5)
CHLORIDE SERPL-SCNC: 103 MMOL/L — SIGNIFICANT CHANGE UP (ref 98–110)
CHLORIDE SERPL-SCNC: 105 MMOL/L — SIGNIFICANT CHANGE UP (ref 98–110)
CO2 SERPL-SCNC: 24 MMOL/L — SIGNIFICANT CHANGE UP (ref 17–32)
CO2 SERPL-SCNC: 24 MMOL/L — SIGNIFICANT CHANGE UP (ref 17–32)
CREAT SERPL-MCNC: 0.8 MG/DL — SIGNIFICANT CHANGE UP (ref 0.7–1.5)
CREAT SERPL-MCNC: 1 MG/DL — SIGNIFICANT CHANGE UP (ref 0.7–1.5)
EGFR: 67 ML/MIN/1.73M2 — SIGNIFICANT CHANGE UP
EGFR: 67 ML/MIN/1.73M2 — SIGNIFICANT CHANGE UP
EGFR: 87 ML/MIN/1.73M2 — SIGNIFICANT CHANGE UP
EGFR: 87 ML/MIN/1.73M2 — SIGNIFICANT CHANGE UP
EOSINOPHIL # BLD AUTO: 0.09 K/UL — SIGNIFICANT CHANGE UP (ref 0–0.5)
EOSINOPHIL NFR BLD AUTO: 1 % — SIGNIFICANT CHANGE UP (ref 0–6)
GLUCOSE SERPL-MCNC: 83 MG/DL — SIGNIFICANT CHANGE UP (ref 70–99)
GLUCOSE SERPL-MCNC: 91 MG/DL — SIGNIFICANT CHANGE UP (ref 70–99)
HCG SERPL QL: NEGATIVE — SIGNIFICANT CHANGE UP
HCT VFR BLD CALC: 50.1 % — HIGH (ref 34.5–45)
HGB BLD-MCNC: 16 G/DL — HIGH (ref 11.5–15.5)
IMM GRANULOCYTES # BLD AUTO: 0.02 K/UL — SIGNIFICANT CHANGE UP (ref 0–0.07)
IMM GRANULOCYTES NFR BLD AUTO: 0.2 % — SIGNIFICANT CHANGE UP (ref 0–0.9)
LACTATE SERPL-SCNC: 1 MMOL/L — SIGNIFICANT CHANGE UP (ref 0.7–2)
LIDOCAIN IGE QN: 61 U/L — HIGH (ref 7–60)
LYMPHOCYTES # BLD AUTO: 2.49 K/UL — SIGNIFICANT CHANGE UP (ref 1–3.3)
LYMPHOCYTES NFR BLD AUTO: 27.2 % — SIGNIFICANT CHANGE UP (ref 13–44)
MAGNESIUM SERPL-MCNC: 2.4 MG/DL — SIGNIFICANT CHANGE UP (ref 1.8–2.4)
MCHC RBC-ENTMCNC: 29.5 PG — SIGNIFICANT CHANGE UP (ref 27–34)
MCHC RBC-ENTMCNC: 31.9 G/DL — LOW (ref 32–36)
MCV RBC AUTO: 92.4 FL — SIGNIFICANT CHANGE UP (ref 80–100)
MONOCYTES # BLD AUTO: 0.74 K/UL — SIGNIFICANT CHANGE UP (ref 0–0.9)
MONOCYTES NFR BLD AUTO: 8.1 % — SIGNIFICANT CHANGE UP (ref 2–14)
NEUTROPHILS # BLD AUTO: 5.77 K/UL — SIGNIFICANT CHANGE UP (ref 1.8–7.4)
NEUTROPHILS NFR BLD AUTO: 62.8 % — SIGNIFICANT CHANGE UP (ref 43–77)
NRBC # BLD AUTO: 0 K/UL — SIGNIFICANT CHANGE UP (ref 0–0)
NRBC # FLD: 0 K/UL — SIGNIFICANT CHANGE UP (ref 0–0)
NRBC BLD AUTO-RTO: 0 /100 WBCS — SIGNIFICANT CHANGE UP (ref 0–0)
PLATELET # BLD AUTO: 196 K/UL — SIGNIFICANT CHANGE UP (ref 150–400)
PMV BLD: 11.2 FL — SIGNIFICANT CHANGE UP (ref 7–13)
POTASSIUM SERPL-MCNC: 4.7 MMOL/L — SIGNIFICANT CHANGE UP (ref 3.5–5)
POTASSIUM SERPL-MCNC: 6.9 MMOL/L — CRITICAL HIGH (ref 3.5–5)
POTASSIUM SERPL-SCNC: 4.7 MMOL/L — SIGNIFICANT CHANGE UP (ref 3.5–5)
POTASSIUM SERPL-SCNC: 6.9 MMOL/L — CRITICAL HIGH (ref 3.5–5)
PROT SERPL-MCNC: 7.2 G/DL — SIGNIFICANT CHANGE UP (ref 6–8)
RBC # BLD: 5.42 M/UL — HIGH (ref 3.8–5.2)
RBC # FLD: 13.7 % — SIGNIFICANT CHANGE UP (ref 10.3–14.5)
SODIUM SERPL-SCNC: 139 MMOL/L — SIGNIFICANT CHANGE UP (ref 135–146)
SODIUM SERPL-SCNC: 140 MMOL/L — SIGNIFICANT CHANGE UP (ref 135–146)
WBC # BLD: 9.17 K/UL — SIGNIFICANT CHANGE UP (ref 3.8–10.5)
WBC # FLD AUTO: 9.17 K/UL — SIGNIFICANT CHANGE UP (ref 3.8–10.5)

## 2025-06-19 PROCEDURE — 80053 COMPREHEN METABOLIC PANEL: CPT

## 2025-06-19 PROCEDURE — S2900: CPT

## 2025-06-19 PROCEDURE — C1889: CPT

## 2025-06-19 PROCEDURE — 76705 ECHO EXAM OF ABDOMEN: CPT | Mod: 26

## 2025-06-19 PROCEDURE — 93010 ELECTROCARDIOGRAM REPORT: CPT

## 2025-06-19 PROCEDURE — 99285 EMERGENCY DEPT VISIT HI MDM: CPT

## 2025-06-19 PROCEDURE — 85027 COMPLETE CBC AUTOMATED: CPT

## 2025-06-19 PROCEDURE — 36415 COLL VENOUS BLD VENIPUNCTURE: CPT

## 2025-06-19 PROCEDURE — 80048 BASIC METABOLIC PNL TOTAL CA: CPT

## 2025-06-19 PROCEDURE — 88304 TISSUE EXAM BY PATHOLOGIST: CPT

## 2025-06-19 PROCEDURE — 99223 1ST HOSP IP/OBS HIGH 75: CPT | Mod: 57

## 2025-06-19 PROCEDURE — 80076 HEPATIC FUNCTION PANEL: CPT

## 2025-06-19 RX ORDER — MELATONIN 5 MG
3 TABLET ORAL AT BEDTIME
Refills: 0 | Status: DISCONTINUED | OUTPATIENT
Start: 2025-06-19 | End: 2025-06-20

## 2025-06-19 RX ORDER — ONDANSETRON HCL/PF 4 MG/2 ML
4 VIAL (ML) INJECTION EVERY 8 HOURS
Refills: 0 | Status: DISCONTINUED | OUTPATIENT
Start: 2025-06-19 | End: 2025-06-20

## 2025-06-19 RX ORDER — ACETAMINOPHEN 500 MG/5ML
1000 LIQUID (ML) ORAL ONCE
Refills: 0 | Status: DISCONTINUED | OUTPATIENT
Start: 2025-06-19 | End: 2025-06-20

## 2025-06-19 RX ORDER — PIPERACILLIN-TAZO-DEXTROSE,ISO 3.375G/5
3.38 IV SOLUTION, PIGGYBACK PREMIX FROZEN(ML) INTRAVENOUS ONCE
Refills: 0 | Status: COMPLETED | OUTPATIENT
Start: 2025-06-19 | End: 2025-06-19

## 2025-06-19 RX ORDER — PIPERACILLIN-TAZO-DEXTROSE,ISO 3.375G/5
3.38 IV SOLUTION, PIGGYBACK PREMIX FROZEN(ML) INTRAVENOUS EVERY 8 HOURS
Refills: 0 | Status: DISCONTINUED | OUTPATIENT
Start: 2025-06-19 | End: 2025-06-20

## 2025-06-19 RX ORDER — KETOROLAC TROMETHAMINE 30 MG/ML
30 INJECTION, SOLUTION INTRAMUSCULAR; INTRAVENOUS EVERY 8 HOURS
Refills: 0 | Status: DISCONTINUED | OUTPATIENT
Start: 2025-06-19 | End: 2025-06-20

## 2025-06-19 RX ORDER — MAGNESIUM, ALUMINUM HYDROXIDE 200-200 MG
30 TABLET,CHEWABLE ORAL EVERY 4 HOURS
Refills: 0 | Status: DISCONTINUED | OUTPATIENT
Start: 2025-06-19 | End: 2025-06-20

## 2025-06-19 RX ORDER — SODIUM CHLORIDE 9 G/1000ML
1000 INJECTION, SOLUTION INTRAVENOUS
Refills: 0 | Status: DISCONTINUED | OUTPATIENT
Start: 2025-06-19 | End: 2025-06-20

## 2025-06-19 RX ORDER — ACETAMINOPHEN 500 MG/5ML
650 LIQUID (ML) ORAL EVERY 6 HOURS
Refills: 0 | Status: DISCONTINUED | OUTPATIENT
Start: 2025-06-19 | End: 2025-06-20

## 2025-06-19 RX ADMIN — Medication 1000 MILLILITER(S): at 16:32

## 2025-06-19 RX ADMIN — Medication 100 MILLIGRAM(S): at 16:32

## 2025-06-19 RX ADMIN — Medication 200 GRAM(S): at 17:23

## 2025-06-19 RX ADMIN — Medication 25 GRAM(S): at 22:33

## 2025-06-19 NOTE — ED PROVIDER NOTE - PHYSICAL EXAMINATION
general: well appearing, no distress  eyes: clear conjunctiva  cardiac: no murmurs, extrasystole, regular rate, regular rhythm  resp: clear throughout all lung fields, no respiratory distress  abdomen: no CVA tenderness, BS x 4, +epigastric tender, no distention, no rashes, no rebound or guarding  msk: pelvis stable, gait steady  skin: no rashes, swelling, bruising

## 2025-06-19 NOTE — H&P ADULT - NSICDXPASTMEDICALHX_GEN_ALL_CORE_FT
PAST MEDICAL HISTORY:  H/O gastric sleeve     History of biliary colic     History of cholecystitis     History of cholelithiasis

## 2025-06-19 NOTE — H&P ADULT - TIME BILLING
The submitted E/M billing level for this visit reflects the total time spent on the day of the visit including face-to-face time spent with the patient, non-face-to-face review of medical records and relevant information, documentation, and asynchronous communication with the patient after a visit via phone, email, or patients EHR portal after the visit.  The medical records reviewed are either scanned into the chart or reviewed with the patient using a patient's electronic medical record portal for patients with records not available to [facility] via electronic transmission platforms from other institutions and labs.  Time spent counseling and performing coordination of care was also included in determining the appropriate EM billing level.  ?  I have reviewed and verified information regarding the chief complaint and history recorded by the ancillary staff and/or the patient. I have independently reviewed and interpreted tests performed by other physicians and facilities as necessary.  ?  I have discussed with the patient differential diagnosis, reason for auxiliary tests if ordered, risks, benefits, alternatives, and complications of each form of therapy were discussed.

## 2025-06-19 NOTE — H&P ADULT - ASSESSMENT
.  Impression:  Patient is a 56 y/o female with past medical history of  Gastric sleeve 10/2022, Cholelithiasis and Cholecystitis who presents with a 1 hour history of sudden onset of severe epigastric pain and nausea after eating lunch and a piece of cake,    Patient with history of known Cholelithiasis, Cholecystitis and prior biliary colic episodes.  This is her 3rd episode of biliary colic.   Reports recent ER visit at [facility] for epigastric pain and had a CT scan abd/pel was significant for Cholecystitis with mild elevated LFT's.   Pain resolved and eventually discharged from the ER with Augmentin but reports never took the Antibiotics.   Follow up labs done with her PMD 2 days later and showed an increase of LFT's with TB of 5.4 but she remained asymptomatic.  Then had  follow up labs which showed resolution of her LFT's and prompted a recent EUS.  Patient had the EUS and Colonoscopy at [facility] in [place] on Tuesday 6/17/2025.   States had EUS findings of Cholelithiasis only.       Patient now with severe sudden onset of epigastric pain and RUQ Sono finding of Cholelithiasis, possible cholecystitis.          Plan:   # Epigastric pain  # Cholelithiasis  # Biliary Colic  # Possible Cholecystitis  - Admit to surgery service under Dr. Shepard.  - NPO.   - IV hydration.   - Pain medications as needed.     - Monitor WBC count and LFT's.   - Ivabx:  Will start Zosyn.   - Chlorhexidine wash per protocol daily.  - Dr. Shepard states will take for a Robotically assisted Cholecystectomy tomorrow morning since patient ate lunch.        # GI prophylaxis:  - Protonix 40 mg IVP daily.  - Zofran IVP PRN N/V.        # VTE prophylaxis:  - Hold pharmacological VTE prophylaxis secondary to low risk and pending surgery in am.  - Leg exercises and OOB to chair and ambulate with assistance.        .  Impression:  Patient is a 54 y/o female with past medical history of  Gastric sleeve 10/2022, Cholelithiasis and Cholecystitis who presents with a 1 hour history of sudden onset of severe epigastric pain and nausea after eating lunch and a piece of cake,    Patient with history of known Cholelithiasis, Cholecystitis and prior biliary colic episodes.  This is her 3rd episode of biliary colic.   Reports recent ER visit at [facility] for epigastric pain and had a CT scan abd/pel was significant for Cholecystitis with mild elevated LFT's.   Pain resolved and eventually discharged from the ER with Augmentin but reports never took the Antibiotics.   Follow up labs done with her PMD 2 days later and showed an increase of LFT's with TB of 5.4 but remained asymptomatic.  Then had  follow up labs which showed resolution of her LFT's and prompted a recent EUS.  Patient had the EUS and Colonoscopy at [facility] in [place] on Tuesday 6/17/2025.   States had EUS findings of Cholelithiasis only.       Patient now with severe sudden onset of epigastric pain and RUQ Sono finding of Cholelithiasis, possible cholecystitis.          Plan:   # Epigastric pain  # Cholelithiasis  # Biliary Colic  # Possible Cholecystitis  - Admit to surgery service under Dr. Shepard.  - NPO.   - IV hydration.   - Pain medications as needed.     - Monitor WBC count and LFT's.   - Will repeat BMP at 7 pm due to hemolyzed specimen with Potassium of 6.9 in ER.  - Ivabx:  Will start Zosyn.   - Chlorhexidine wash per protocol daily.  - Dr. Shepard states will take for a Robotically assisted Cholecystectomy tomorrow since patient ate lunch.        # GI prophylaxis:  - Protonix 40 mg IVP daily.  - Zofran IVP PRN N/V.        # VTE prophylaxis:  - Hold pharmacological VTE prophylaxis secondary to low risk and pending surgery in am.  - Leg exercises and OOB to chair and ambulate with assistance.

## 2025-06-19 NOTE — H&P ADULT - NSHPLABSRESULTS_GEN_ALL_CORE
.                          16.0   9.17  )-----------( 196      ( 19 Jun 2025 15:40 )               50.1                            US Abdomen Upper Quadrant Right (06.19.25 @ 15:44)     ACC: [id number] EXAM:  US ABDOMEN RT UPR QUADRANT   ORDERED BY: BRANDON MARTINEZ     PROCEDURE DATE:  06/19/2025      INTERPRETATION:  CLINICAL INFORMATION: Right upper quadrant pain.    COMPARISON: April 30, 2025    TECHNIQUE: Sonography of the right upper quadrant.    FINDINGS:  Liver: Within normal limits.  Bile ducts: Normal caliber. Common bile duct measures 6 mm.  Gallbladder: Cholelithiasis. Positive Bennett's sign. No gallbladder wall   thickening.  Pancreas: Visualized portions are within normal limits.  Right kidney: 9.8 cm. No hydronephrosis.  Ascites: None.  IVC: Visualized portions are within normal limits.      IMPRESSION:  Cholelithiasis. Possible cholecystitis. .                            16.0   9.17  )-----------( 196      ( 19 Jun 2025 15:40 )               50.1       06-19    139  |  103  |  14  ----------------------------<  83  6.9[HH]   |  24  |  1.0    Ca    9.1      19 Jun 2025 15:40  Mg     2.4     06-19    TPro  7.2  /  Alb  4.2  /  TBili  0.9  /  DBili  x   /  AST  72[H]  /  ALT  26  /  AlkPhos  173[H]  06-19              Urinalysis Basic - ( 19 Jun 2025 15:40 )    Color: x / Appearance: x / SG: x / pH: x  Gluc: 83 mg/dL / Ketone: x  / Bili: x / Urobili: x   Blood: x / Protein: x / Nitrite: x   Leuk Esterase: x / RBC: x / WBC x   Sq Epi: x / Non Sq Epi: x / Bacteria: x        Lactate Trend  06-19 @ 15:40 Lactate:1.0            US Abdomen Upper Quadrant Right (06.19.25 @ 15:44)     ACC: [id number] EXAM:  US ABDOMEN RT UPR QUADRANT   ORDERED BY: BRANDON MARTINEZ     PROCEDURE DATE:  06/19/2025      INTERPRETATION:  CLINICAL INFORMATION: Right upper quadrant pain.    COMPARISON: April 30, 2025    TECHNIQUE: Sonography of the right upper quadrant.    FINDINGS:  Liver: Within normal limits.  Bile ducts: Normal caliber. Common bile duct measures 6 mm.  Gallbladder: Cholelithiasis. Positive Bennett's sign. No gallbladder wall   thickening.  Pancreas: Visualized portions are within normal limits.  Right kidney: 9.8 cm. No hydronephrosis.  Ascites: None.  IVC: Visualized portions are within normal limits.      IMPRESSION:  Cholelithiasis. Possible cholecystitis.

## 2025-06-19 NOTE — H&P ADULT - NSICDXFAMILYHX_GEN_ALL_CORE_FT
FAMILY HISTORY:  Father  Still living? Unknown  Family hx of colon cancer, Age at diagnosis: Age Unknown  Family hx of prostate cancer, Age at diagnosis: Age Unknown    Mother  Still living? Unknown  Family history of breast cancer, Age at diagnosis: Age Unknown

## 2025-06-19 NOTE — H&P ADULT - NSHPPHYSICALEXAM_GEN_ALL_CORE
PHYSICAL EXAM:    General:  WD, WN female in mild distress from epigastric pain.    Skin:  Warm, dry, good color and turgor, no rash, no pressure ulcers.    Eyes:  PERRLA, EOM intact.    Neck:  No tenderness.    Back:  No spinal tenderness.    Respiratory:  CTA B/L, No wheezes, rales or rhonchi.    Cardiovascular:  S1 & S2, RRR, no murmurs, rubs or gallops.     Gastrointestinal:   Soft, No distention, (+) Bennett's sign, (+) Epigastric pain with palpation,  No rebound, guarding or Peritoneal signs.    Genitourinary:  No suprapubic tenderness,  No CVA tenderness.    Extremities:  Normal rom, no edema, no calf tenderness.    Vascular:  No cyanosis, + Palpable distal pulses, No calf tenderness.     Lymph Nodes:  No lymphadenopathy.    Musculoskeletal:  No joint swelling, Free ROM.    Neurological:   A&Ox4, No focal deficits.

## 2025-06-19 NOTE — H&P ADULT - NS ATTEND AMEND GEN_ALL_CORE FT
I saw and examined the patient.  I reviewed the findings from EUS and the patient's previous history.  I reviewed her labs and imaging personally reviewing her scans.  Most likely she has symptomatic cholelithiasis or early cholecystitis and may have exhibited passage of some biliary sludge.  We will recheck her labs in the morning and decide whether surgery or MRI is warranted although I think that cholecystectomy will be most reasonable considering the current findings.  We had an extensive conversation about the risks and benefits of surgery the process and procedure and typical expectations with regards to pain return to work and the risks and intended benefits.  All her questions were answered.  Pleasure assisting in the care of this nice patient.

## 2025-06-19 NOTE — PATIENT PROFILE ADULT - HAVE YOU EXPERIENCED VIOLENCE OR A TRAUMATIC EVENT?
no PRE-OP DIAGNOSIS:  H/O ileostomy 06-Jun-2024 11:54:15  Andreia Stanley  Rectal cancer 06-Jun-2024 11:54:24  Andreia Stanley

## 2025-06-19 NOTE — ED PROVIDER NOTE - OBJECTIVE STATEMENT
56 y/o female with hx of gastric sleeve, gallstones presents to the ED for evaluation of intermittent biliary colic over past few weeks worsening and increasing in frequency. patient denies any vomiting or diarrhea. no abdominal distention. no dysuria. patient s/p eating fatty meal this am . no cp, or sob

## 2025-06-19 NOTE — H&P ADULT - NSHPREVIEWOFSYSTEMS_GEN_ALL_CORE
REVIEW OF SYSTEMS:    CONSTITUTIONAL:  No weakness, fevers or chills  EYES/ENT:  No visual changes;  No vertigo or throat pain   NECK:  No pain or stiffness  RESPIRATORY:  No cough, wheezing, hemoptysis; No shortness of breath  CARDIOVASCULAR:  No chest pain or palpitations  GASTROINTESTINAL:   (+) Epigastric pain.  (+) Nausea,  No vomiting, or hematemesis;  No diarrhea or constipation.  No melena or hematochezia.  GENITOURINARY:  No dysuria, frequency or hematuria  MUSCULOSKELETAL:  FROM all extremities, normal strength, No calf tenderness  NEUROLOGICAL:  No numbness or weakness  SKIN:  No itching, rashes

## 2025-06-19 NOTE — PATIENT PROFILE ADULT - MONEY FOR FOOD
[FreeTextEntry1] : Ms. Najera is a 73 y/o female with a history of HTN, DM, GERD, eosinophilic asthma, allergies and overweight presenting for pulmonary evaluation following lung surgery which revealed benign carcinoid tumorlets. She is currently with #1 issue restrictive dysfunction post lobectomy - s/p clearance for Breast Surgery (right Mastectomy) - now HOWELL \par                    \par Ms. Najera' SOB is multifactorial due to:\par -overweight / out of shape\par -poor mechanics of breathing\par -asthma/restrictive dysfunction (s/p surgery)\par -emphysema \par -?cardiac\par \par Her chronic cough is multifactorial due to: -(active)\par -asthma (eosinophilic)\par -LPR/reflux\par \par problem 1: asthma (severe persistent)- compliant - (Active) \par -continue Ventolin rescue inhaler 2 inhalations before exercise, Q6H\par -continue Incruse 1 inhalation daily\par -continue Breo Ellipta 200 at 1 inhalation daily \par -continue Singulair 10 mg QHS \par -continue Albuterol via nebulizer PRN q6H- rescript 3/2022\par -Inhaler technique reviewed as well as oral hygiene techniques reviewed with patient. Avoidance of cold air, extremes of temperature, rescue inhaler should be used before exercise. Order of medication reviewed with patient. Recommended use of a cool mist humidifier in the bedroom. \par -Asthma is believed to be caused by inherited (genetic) and environmental factor, but its exact cause is unknown. Asthma may be triggered by allergens, lung infections, or irritants in the air. Asthma triggers are different for each person\par \par Problem 1A: Chronic cough \par -Amitriptyline 10 mg 3 x qd \par Any cough greater than three weeks duration-differential diagnosis includes-asthma, upper airway cough syndrome, post nasal drip syndrome, gastroesophageal reflux, laryngopharyngeal reflux, cardiac disease (congestive heart failure, medicines, effects, etc), medication effects (b-blockers, ace inhibitors, ARBs, glaucoma meds, etc.), smoking, infectious, multifactorial, etc.\par \par Problem 1B restrictive dysfunction\par -"fixed" problem s/p surgery\par \par problem 2: poor mechanics of breathing\par -Recommended to research the Bhargav Montejo and Buteyko breathing techniques \par -Proper breathing techniques were reviewed with an emphasis of exhalation. Patient instructed to breath in for 1 second and out for four seconds. Patient was encouraged to not talk while walking. \par \par problem 3: overweight\par -Weight loss, exercise, and diet control were discussed and are highly encouraged. Treatment options were given such as, aqua therapy, and contacting a nutritionist. Recommended to use the elliptical, stationary bike, less use of treadmill. Mindful eating was explained to the patient Obesity is associated with worsening asthma, shortness of breath, and potential for cardiac disease, diabetes, and other underlying medical conditions. \par \par problem 3A: Poor Balance\par -Given prescription for gait training and balance therapy (STARS)\par \par problem 4: LPR/reflux (controlled) \par -continue Pepcid 40 mg QHS \par -continue Protonix 40 mg QD in the morning \par -Rule of 2s: avoid eating too much, eating too late, eating too spicy, eating two hours before bed\par -Things to avoid including overeating, spicy foods, tight clothing, eating within three hours of bed, this list is not all inclusive. \par -For treatment of reflux, possible options discussed including diet control, H2 blockers, PPIs, as well as coating motility agents discussed as treatment options. Timing of meals and proximity of last meal to sleep were discussed. If symptoms persist, a formal gastrointestinal evaluation is needed.\par \par problem 5: allergy/postnasal drip\par -s/p blood work including: allergen panel food (-), IgE panel (-), asthma panel(-), eosinophil level (-), vitamin D level (low)\par -recommended to use Xlear nasal saline spray \par -continue Clarinex 5 mg QAM \par \par problem 6: carcinoid tumorlets (stable 7/2020) \par -follow up CT scan (last 7/2021) next 7/2022 \par \par problem 7: r/o YRIS\par -recommend Home Sleep Study (virtuox)\par -recommended Silenor 3 mg qHS\par Treatment options discussed including CPAP/BiPAP machine, oral appliance, ProVent therapy, Oxy-Aid by Respitec, new technologies, or positional sleep.Recommended use of the CPAP machine for moderate (AHI >15), moderate to severe (AHI 15-30) and severe patients (AHI > 30). Recommended weight loss which can reduce AHI especially in weight loss of greater than 5% of BMI. Positional sleep is recommended in those with low AHI, low-moderate MBI, and younger age. For severe sleep apnea, the hypoglossal nerve stimulator was recommended as well.\par \par problem 8: eosinophilia- asthma \par -she is a candidate for Nucala injections- Set Up Now \par -The safety and efficacy of Nucala was established in three double-blind, randomized, placebo-controlled trials in patients with severe asthma. Compared to a placebo, patients with severe asthma receiving Nucala had fewer exacerbation requiring hospitalization and/or emergency department visits, and a longer time to first exacerbation.  In addition, patients with severe asthma receiving Nucala experienced greater reductions in their daily maintenance oral corticosteroid dose, while maintaining asthma control compared with patients receiving placebo. Treatment with Nucala did not result in a significant improvement in lung function, as measured by the volume of air exhaled by patients in one second. The most common side effects include: headache, injection site reactions, back pain, weakness, and fatigue; hypersensitivity reactions can occur within hours or days including swelling of the face, mouth, and tongue, fainting, dizziness, hives, breathing problems, and rash; herpes zoster infections have occurred. The drug is a monoclonal antibody that inhibits interleukin -5 which helps regular eosinophils, a type of white blood cell that contributes to asthma. The over-production of eosinophils can cause inflammation in the lungs, increasing the frequency of asthma attacks. Patients must also take other medications, including high dose inhaled corticosteroids and at least one additional asthma drug.\par \par problem 9: low vitamin D\par -s/p blood work to include: CBC, vitamin D, LFTs, HbA1c\par -continue supplemental vitamin D 50,000 every 2 weeks (recheck)\par -Has been associated with asthma exacerbations and increased allergic symptoms. The goal based on recent information is maintaining levels between 50-70 and low normal is 30. Recommended 50,000 units every two weeks to once a month depending on the level. \par \par problem 10: anemia\par -referred to have a GI evaluation and was given the name of Dr. Liban Samuel's office  (recheck)\par \par problem 11: Health Maintenance/COVID19 Precautions: covid vaccine x2 \par - Clean your hands often. Wash your hands often with soap and water for at least 20 seconds, especially after blowing your nose, coughing, or sneezing, or having been in a public place.\par - If soap and water are not available, use a hand  that contains at least 60% alcohol.\par - To the extent possible, avoid touching high-touch surfaces in public places - elevator buttons, door handles, handrails, handshaking with people, etc. Use a tissue or your sleeve to cover your hand or finger if you must touch something.\par - Wash your hands after touching surfaces in public places.\par - Avoid touching your face, nose, eyes, etc.\par - Clean and disinfect your home to remove germs: practice routine cleaning of frequently touched surfaces (for example: tables, doorknobs, light switches, handles, desks, toilets, faucets, sinks & cell phones)\par - Avoid crowds, especially in poorly ventilated spaces. Your risk of exposure to respiratory viruses like COVID-19 may increase in crowded, closed-in settings with little air circulation if there are people in the crowd who are sick. All patients are recommended to practice social distancing and stay at least 6 feet away from others.\par - Avoid all non-essential travel including plane trips, and especially avoid embarking on cruise ships.\par -If COVID-19 is spreading in your community, take extra measures to put distance between yourself and other people to further reduce your risk of being exposed to this new virus.\par -Stay home as much as possible.\par - Consider ways of getting food brought to your house through family, social, or commercial networks\par -Be aware that the virus has been known to live in the air up to 3 hours post exposure, cardboard up to 24 hours post exposure, copper up to 4 hours post exposure, steel and plastic up to 2-3 days post exposure. Risk of transmission from these surfaces are affected by many variables.\par Immune Support Recommendations:\par -OTC Vitamin C 500mg BID \par -OTC Quercetin 250-500mg BID \par -OTC Zinc 75-100mg per day \par -OTC Melatonin 1 or 2 mg a night \par -OTC Vitamin D 1-4000mg per day \par -OTC Tonic Water 8oz per day\par Asthma and COVID19:\par You need to make sure your asthma is under control. This often requires the use of inhaled corticosteroids (and sometimes oral corticosteroids). Inhaled corticosteroids do not likely reduce your immune system’s ability to fight infections, but oral corticosteroids may. It is important to use the steps above to protect yourself to limit your exposure to any respiratory virus. \par \par Problem 12: health maintenance\par - S/p Covid 19 vaccine (Pfizer) x2 \par -s/p influenza vaccine 2021 \par -recommended strep pneumonia vaccines after age 65: Prevnar-13 vaccine, followed by Pneumo vaccine 23 one year following (completed) \par -recommended early intervention for URIs\par -recommended regular osteoporosis evaluations\par -recommended early dermatological evaluations\par -recommended after the age of 50 to the age of 70, colonoscopy every 5 years \par \par F/U in 4 months \par She is encouraged to call with any questions, changes, or concerns.\par \par                              ***************PRE-OP CLEARANCE FOR BREAST SURGERY*********\par -at this point in time there are no absolute pulmonary contraindications to go forward with the planned procedure \par -at the time of surgery s/he should have optimal pain control, incentive spirometry, early ambulation, DVT and GI prophylaxis. 
no

## 2025-06-19 NOTE — H&P ADULT - HISTORY OF PRESENT ILLNESS
Patient is a 56 y/o female with past medical history of  Gastric sleeve 10/2022, Cholelithiasis and Cholecystitis who presents with a 1 hour history of sudden onset of severe epigastric pain and nausea after eating lunch and a piece of cake,    Patient with history of known Cholelithiasis, Cholecystitis and prior biliary colic episodes. This is her 3rd episode of biliary colic.   Reports recent ER visit at [facility] for epigastric pain and had a CT scan abd/pel was significant for Cholecystitis with mild elevated LFT's.   Pain resolved and eventually discharged from the ER with Augmentin but reports never took the Antibiotics.   Follow up labs done with her PMD 2 days later and showed an increase of LFT's with TB of 5.4 but she remained asymptomatic.  Then had  follow up labs which showed resolution of her LFT's and prompted a recent EUS.  Patient had the EUS and Colonoscopy at [facility] in [place] on Tuesday 6/17/2025.   States had EUS findings of Cholelithiasis only.     Patient denies fever, chills, tremors, vomiting, diarrhea, constipation, hematemesis, hematochezia, melena, dysuria, pyuria, hematuria, chest pain or shortness of breath.

## 2025-06-20 ENCOUNTER — TRANSCRIPTION ENCOUNTER (OUTPATIENT)
Age: 55
End: 2025-06-20

## 2025-06-20 ENCOUNTER — RESULT REVIEW (OUTPATIENT)
Age: 55
End: 2025-06-20

## 2025-06-20 VITALS
DIASTOLIC BLOOD PRESSURE: 70 MMHG | HEART RATE: 61 BPM | OXYGEN SATURATION: 99 % | RESPIRATION RATE: 18 BRPM | TEMPERATURE: 97 F | SYSTOLIC BLOOD PRESSURE: 106 MMHG

## 2025-06-20 LAB
ALBUMIN SERPL ELPH-MCNC: 3.7 G/DL — SIGNIFICANT CHANGE UP (ref 3.5–5.2)
ALBUMIN SERPL ELPH-MCNC: 3.8 G/DL — SIGNIFICANT CHANGE UP (ref 3.5–5.2)
ALBUMIN SERPL ELPH-MCNC: 3.9 G/DL — SIGNIFICANT CHANGE UP (ref 3.5–5.2)
ALP SERPL-CCNC: 205 U/L — HIGH (ref 30–115)
ALP SERPL-CCNC: 205 U/L — HIGH (ref 30–115)
ALP SERPL-CCNC: 207 U/L — HIGH (ref 30–115)
ALT FLD-CCNC: 187 U/L — HIGH (ref 0–41)
ALT FLD-CCNC: 187 U/L — HIGH (ref 0–41)
ALT FLD-CCNC: 193 U/L — HIGH (ref 0–41)
ANION GAP SERPL CALC-SCNC: 10 MMOL/L — SIGNIFICANT CHANGE UP (ref 7–14)
ANION GAP SERPL CALC-SCNC: 9 MMOL/L — SIGNIFICANT CHANGE UP (ref 7–14)
AST SERPL-CCNC: 182 U/L — HIGH (ref 0–41)
AST SERPL-CCNC: 189 U/L — HIGH (ref 0–41)
AST SERPL-CCNC: 194 U/L — HIGH (ref 0–41)
BILIRUB DIRECT SERPL-MCNC: 0.2 MG/DL — SIGNIFICANT CHANGE UP (ref 0–0.3)
BILIRUB INDIRECT FLD-MCNC: 0.9 MG/DL — SIGNIFICANT CHANGE UP (ref 0.2–1.2)
BILIRUB SERPL-MCNC: 1.1 MG/DL — SIGNIFICANT CHANGE UP (ref 0.2–1.2)
BILIRUB SERPL-MCNC: 1.1 MG/DL — SIGNIFICANT CHANGE UP (ref 0.2–1.2)
BILIRUB SERPL-MCNC: 1.2 MG/DL — SIGNIFICANT CHANGE UP (ref 0.2–1.2)
BUN SERPL-MCNC: 10 MG/DL — SIGNIFICANT CHANGE UP (ref 10–20)
BUN SERPL-MCNC: 10 MG/DL — SIGNIFICANT CHANGE UP (ref 10–20)
CALCIUM SERPL-MCNC: 8.9 MG/DL — SIGNIFICANT CHANGE UP (ref 8.4–10.5)
CALCIUM SERPL-MCNC: 9.3 MG/DL — SIGNIFICANT CHANGE UP (ref 8.4–10.5)
CHLORIDE SERPL-SCNC: 108 MMOL/L — SIGNIFICANT CHANGE UP (ref 98–110)
CHLORIDE SERPL-SCNC: 109 MMOL/L — SIGNIFICANT CHANGE UP (ref 98–110)
CO2 SERPL-SCNC: 25 MMOL/L — SIGNIFICANT CHANGE UP (ref 17–32)
CO2 SERPL-SCNC: 26 MMOL/L — SIGNIFICANT CHANGE UP (ref 17–32)
CREAT SERPL-MCNC: 1 MG/DL — SIGNIFICANT CHANGE UP (ref 0.7–1.5)
CREAT SERPL-MCNC: 1 MG/DL — SIGNIFICANT CHANGE UP (ref 0.7–1.5)
EGFR: 67 ML/MIN/1.73M2 — SIGNIFICANT CHANGE UP
GLUCOSE SERPL-MCNC: 82 MG/DL — SIGNIFICANT CHANGE UP (ref 70–99)
GLUCOSE SERPL-MCNC: 84 MG/DL — SIGNIFICANT CHANGE UP (ref 70–99)
HCT VFR BLD CALC: 47.7 % — HIGH (ref 34.5–45)
HGB BLD-MCNC: 14.8 G/DL — SIGNIFICANT CHANGE UP (ref 11.5–15.5)
MCHC RBC-ENTMCNC: 28.8 PG — SIGNIFICANT CHANGE UP (ref 27–34)
MCHC RBC-ENTMCNC: 31 G/DL — LOW (ref 32–36)
MCV RBC AUTO: 92.8 FL — SIGNIFICANT CHANGE UP (ref 80–100)
NRBC # BLD AUTO: 0 K/UL — SIGNIFICANT CHANGE UP (ref 0–0)
NRBC # FLD: 0 K/UL — SIGNIFICANT CHANGE UP (ref 0–0)
NRBC BLD AUTO-RTO: 0 /100 WBCS — SIGNIFICANT CHANGE UP (ref 0–0)
PLATELET # BLD AUTO: 190 K/UL — SIGNIFICANT CHANGE UP (ref 150–400)
PMV BLD: 10.5 FL — SIGNIFICANT CHANGE UP (ref 7–13)
POTASSIUM SERPL-MCNC: 4.6 MMOL/L — SIGNIFICANT CHANGE UP (ref 3.5–5)
POTASSIUM SERPL-MCNC: 5.6 MMOL/L — HIGH (ref 3.5–5)
POTASSIUM SERPL-SCNC: 4.6 MMOL/L — SIGNIFICANT CHANGE UP (ref 3.5–5)
POTASSIUM SERPL-SCNC: 5.6 MMOL/L — HIGH (ref 3.5–5)
PROT SERPL-MCNC: 6.1 G/DL — SIGNIFICANT CHANGE UP (ref 6–8)
PROT SERPL-MCNC: 6.1 G/DL — SIGNIFICANT CHANGE UP (ref 6–8)
PROT SERPL-MCNC: 6.4 G/DL — SIGNIFICANT CHANGE UP (ref 6–8)
RBC # BLD: 5.14 M/UL — SIGNIFICANT CHANGE UP (ref 3.8–5.2)
RBC # FLD: 13.8 % — SIGNIFICANT CHANGE UP (ref 10.3–14.5)
SODIUM SERPL-SCNC: 143 MMOL/L — SIGNIFICANT CHANGE UP (ref 135–146)
SODIUM SERPL-SCNC: 144 MMOL/L — SIGNIFICANT CHANGE UP (ref 135–146)
WBC # BLD: 7.26 K/UL — SIGNIFICANT CHANGE UP (ref 3.8–10.5)
WBC # FLD AUTO: 7.26 K/UL — SIGNIFICANT CHANGE UP (ref 3.8–10.5)

## 2025-06-20 PROCEDURE — 88304 TISSUE EXAM BY PATHOLOGIST: CPT | Mod: 26

## 2025-06-20 PROCEDURE — 47562 LAPAROSCOPIC CHOLECYSTECTOMY: CPT

## 2025-06-20 PROCEDURE — S2900 ROBOTIC SURGICAL SYSTEM: CPT | Mod: NC

## 2025-06-20 RX ORDER — ONDANSETRON HCL/PF 4 MG/2 ML
4 VIAL (ML) INJECTION ONCE
Refills: 0 | Status: DISCONTINUED | OUTPATIENT
Start: 2025-06-20 | End: 2025-06-20

## 2025-06-20 RX ORDER — ENOXAPARIN SODIUM 100 MG/ML
40 INJECTION SUBCUTANEOUS EVERY 24 HOURS
Refills: 0 | Status: DISCONTINUED | OUTPATIENT
Start: 2025-06-21 | End: 2025-06-20

## 2025-06-20 RX ORDER — OXYCODONE HYDROCHLORIDE AND ACETAMINOPHEN 10; 325 MG/1; MG/1
1 TABLET ORAL ONCE
Refills: 0 | Status: DISCONTINUED | OUTPATIENT
Start: 2025-06-20 | End: 2025-06-20

## 2025-06-20 RX ORDER — SODIUM CHLORIDE 9 G/1000ML
1000 INJECTION, SOLUTION INTRAVENOUS
Refills: 0 | Status: DISCONTINUED | OUTPATIENT
Start: 2025-06-20 | End: 2025-06-20

## 2025-06-20 RX ORDER — HYDROMORPHONE/SOD CHLOR,ISO/PF 2 MG/10 ML
0.5 SYRINGE (ML) INJECTION
Refills: 0 | Status: DISCONTINUED | OUTPATIENT
Start: 2025-06-20 | End: 2025-06-20

## 2025-06-20 RX ADMIN — Medication 650 MILLIGRAM(S): at 17:50

## 2025-06-20 RX ADMIN — Medication 40 MILLIGRAM(S): at 11:20

## 2025-06-20 RX ADMIN — Medication 25 GRAM(S): at 05:14

## 2025-06-20 NOTE — DISCHARGE NOTE NURSING/CASE MANAGEMENT/SOCIAL WORK - FINANCIAL ASSISTANCE
[facility] provides services at a reduced cost to those who are determined to be eligible through [facility]’s financial assistance program. Information regarding [facility]’s financial assistance program can be found by going to [email or web address] or by calling [phone].

## 2025-06-20 NOTE — DISCHARGE NOTE PROVIDER - CARE PROVIDER_API CALL
Deep Shepard  Surgery (General Surgery)  [address] [place] [address], [address]  [place] [address]  Phone: [phone]  Fax: [phone]  Follow Up Time:

## 2025-06-20 NOTE — PROGRESS NOTE ADULT - NS ATTEND AMEND GEN_ALL_CORE FT
OR plan today.  All her questions were answered.  We had an extensive discussion about the risk and benefits of the procedure once again and she has signed informed consent.

## 2025-06-20 NOTE — DISCHARGE NOTE PROVIDER - HOSPITAL COURSE
Patient is a 54 y/o female with past medical history of  Gastric sleeve 10/2022, Cholelithiasis and Cholecystitis who presents with a 1 hour history of sudden onset of severe epigastric pain and nausea after eating lunch and a piece of cake,    Patient with history of known Cholelithiasis, Cholecystitis and prior biliary colic episodes. This is her 3rd episode of biliary colic.   Reports recent ER visit at [facility] for epigastric pain and had a CT scan abd/pel was significant for Cholecystitis with mild elevated LFT's.   Pain resolved and eventually discharged from the ER with Augmentin but reports never took the Antibiotics.   Follow up labs done with her PMD 2 days later and showed an increase of LFT's with TB of 5.4 but she remained asymptomatic.  Then had  follow up labs which showed resolution of her LFT's and prompted a recent EUS.  Patient had the EUS and Colonoscopy at [facility] in [place] on Tuesday 6/17/2025.   States had EUS findings of Cholelithiasis only.       Patient was admitted for cholecystitis, now s/p robotic cholecystectomy.

## 2025-06-20 NOTE — BRIEF OPERATIVE NOTE - OPERATION/FINDINGS
Entered abdomen with veress needle for robotic cholecystectomy. A few adhesions between omentum and prior laparoscopic scars, sharply cut down adhesions. Gallbladder with thickened edematous wall. Critical view of safety obtained, an accessory duct noted posterio-laterally. Cystic artery cauterized. Cystic duct clipped x3 and sharply divided. 20-Mar-2023 22:44

## 2025-06-20 NOTE — PROGRESS NOTE ADULT - ASSESSMENT
Patient is a 56 y/o female with past medical history of  Gastric sleeve 10/2022, Cholelithiasis and Cholecystitis who presents with a 1 hour history of sudden onset of severe epigastric pain and nausea after eating lunch and a piece of cake, admitted with biliary colic and possible cholecystitis.    # Biliary Colic, Possible Cholecystitis  # Cholelithiasis  # transaminitis  - keep NPO for planned OR today- robot assisted laparoscopic cholecystectomy  - IVF LR @ 125  - Pain control  - Monitor WBC count and LFT's.   - hyperkalemia resolved  - continue abx    # GI prophylaxis  - Protonix 40 mg IVP daily.  - Zofran IVP PRN N/V    # VTE prophylaxis:  - ambulatory, low risk for vte

## 2025-06-20 NOTE — PROGRESS NOTE ADULT - SUBJECTIVE AND OBJECTIVE BOX
General Surgery Progress Note    Patient seen & examined.  No acute events noted overnight.  Patient reports her pain is improved.  + Flatus. NPO  Patient denies subjective fever, chills, tremors, N/V/D, CP or SOB.     MEDICATIONS  (STANDING):  chlorhexidine 2% Cloths 1 Application(s) Topical <User Schedule>  lactated ringers. 1000 milliLiter(s) (125 mL/Hr) IV Continuous <Continuous>  pantoprazole  Injectable 40 milliGRAM(s) IV Push daily  piperacillin/tazobactam IVPB.. 3.375 Gram(s) IV Intermittent every 8 hours    MEDICATIONS  (PRN):  acetaminophen     Tablet .. 650 milliGRAM(s) Oral every 6 hours PRN Temp greater or equal to 38C (100.4F), Mild Pain (1 - 3)  acetaminophen   IVPB .. 1000 milliGRAM(s) IV Intermittent once PRN Moderate Pain (4 - 6)  aluminum hydroxide/magnesium hydroxide/simethicone Suspension 30 milliLiter(s) Oral every 4 hours PRN Dyspepsia  ketorolac   Injectable 30 milliGRAM(s) IV Push every 8 hours PRN Moderate Pain (4 - 6)  melatonin 3 milliGRAM(s) Oral at bedtime PRN Insomnia  morphine  - Injectable 4 milliGRAM(s) IV Push every 4 hours PRN Severe Pain (7 - 10)  ondansetron Injectable 4 milliGRAM(s) IV Push every 8 hours PRN Nausea and/or Vomiting    Vital Signs Last 24 Hrs  T(C): 36.7 (20 Jun 2025 08:57), Max: 36.7 (19 Jun 2025 15:07)  T(F): 98.1 (20 Jun 2025 08:57), Max: 98.1 (20 Jun 2025 05:42)  HR: 74 (20 Jun 2025 08:57) (54 - 74)  BP: 96/55 (20 Jun 2025 08:57) (96/55 - 130/79)  RR: 18 (20 Jun 2025 08:57) (16 - 18)  SpO2: 97% (20 Jun 2025 08:57) (97% - 99%)    Parameters below as of 19 Jun 2025 15:07  Patient On (Oxygen Delivery Method): room air    Physical Exam:  General:  WD, WN, conversant in NAD.   Neck:  Supple, No JVD.  Chest:  Clear to auscultation bilaterally, Equal expansion bilaterally, equal breath sounds, No W/R/R.  CV:  S1 & S2, RRR, No M/R/G.   Abdomen:  + Bowel sounds, soft, no distention, non-tender,  no rebound/guarding or peritoneal signs.    Extremities:  No C/C/E,  No calf tenderness B/L.        LABS:                        14.8   7.26  )-----------( 190      ( 20 Jun 2025 07:37 )             47.7     06-20    143  |  108  |  10  ----------------------------<  84  4.6   |  25  |  1.0    Ca    8.9      20 Jun 2025 08:45  Mg     2.4     06-19    TPro  6.4  /  Alb  3.9  /  TBili  1.2  /  DBili  x   /  AST  182[H]  /  ALT  193[H]  /  AlkPhos  207[H]  06-20      Urinalysis Basic - ( 20 Jun 2025 08:45 )    Color: x / Appearance: x / SG: x / pH: x  Gluc: 84 mg/dL / Ketone: x  / Bili: x / Urobili: x   Blood: x / Protein: x / Nitrite: x   Leuk Esterase: x / RBC: x / WBC x   Sq Epi: x / Non Sq Epi: x / Bacteria: x        RADIOLOGY & ADDITIONAL STUDIES:    US Abdomen Upper Quadrant Right (06.19.25 @ 15:44)   Cholelithiasis. Possible cholecystitis.

## 2025-06-20 NOTE — DISCHARGE NOTE NURSING/CASE MANAGEMENT/SOCIAL WORK - PATIENT PORTAL LINK FT
You can access the FollowMyHealth Patient Portal offered by [facility] by registering at the following website: [email or web address]. By joining [facility]’s FollowMyHealth portal, you will also be able to view your health information using other applications (apps) compatible with our system.

## 2025-06-20 NOTE — CHART NOTE - NSCHARTNOTEFT_GEN_A_CORE
General Surgery Post op Check    Pt seen and examined without complaints. Pain is controlled.    Pt denies chest pain, n/v, abd pain, wound drainage    Vital Signs Last 24 Hrs  T(C): 36.3 (20 Jun 2025 16:39), Max: 36.7 (20 Jun 2025 05:42)  T(F): 97.4 (20 Jun 2025 16:39), Max: 98.1 (20 Jun 2025 05:42)  HR: 61 (20 Jun 2025 16:39) (61 - 74)  BP: 106/70 (20 Jun 2025 16:39) (96/55 - 125/59)  BP(mean): --  RR: 18 (20 Jun 2025 16:39) (14 - 18)  SpO2: 99% (20 Jun 2025 16:39) (95% - 100%)    Parameters below as of 20 Jun 2025 16:05  Patient On (Oxygen Delivery Method): room air        I&O's Summary      Physical Exam  Gen: NAD, comfortable A&Ox3  Pulm: No respiratory distress, no subcostal retractions  ABD: incinsom sites appearing c/d/i  Extremities:  FROM, warm and well perfused, equal bilateral muscle strength      A/P: 55y Female s/p Robot-assisted cholecystectomy #0  -DVT prophylaxis w/ SCD.    Encouraged OOB  pt tolerating diet      -Dispo:  Discharge home
PACU ANESTHESIA ADMISSION NOTE      Procedure: Robot-assisted cholecystectomy      Post op diagnosis:  Acute cholecystitis        ____  Intubated  TV:______       Rate: ______      FiO2: ______    X____  Patent Airway    __X__  Full return of protective reflexes    __X__  Full recovery from anesthesia / back to baseline     Vitals:   T98.1:           R: 16                 BP: 111/57                 Sat:96                   P: 80      Mental Status:  _ZX___ Awake   _____ Alert   _X____ Drowsy   _____ Sedated    Nausea/Vomiting:  ____ NO  ______Yes,   See Post - Op Orders          Pain Scale (0-10):  _____    Treatment: ____ None    ____ See Post - Op/PCA Orders    Post - Operative Fluids:   ____ Oral   ____ See Post - Op Orders    Plan: Discharge:   ____Home    X   _____Floor     _____Critical Care    _____  Other:_________________    Comments:

## 2025-06-20 NOTE — DISCHARGE NOTE NURSING/CASE MANAGEMENT/SOCIAL WORK - NSDCPEFALRISK_GEN_ALL_CORE
For information on Fall & Injury Prevention, visit: [email or web address] OR  [email or web address] OR  https://www.cdc.gov/steadi/patient.html

## 2025-06-20 NOTE — DISCHARGE NOTE PROVIDER - NSDCFUADDINST_GEN_ALL_CORE_FT
You are being discharged after robotic cholecystectomy. Your incisions were closed with sutures, and covered with steri strips and bandaids. Please leave the dressing on for 2 days. Then you may remove the bandaids and shower, allowing water to flow over the incisions but please do not scrub at them, and please leave the steri strips in place - they will fall off on their own. Please avoid swimming or submerging in a bath for 2 weeks. Please avoid any heavy lifting greater than 10 lbs for 6 weeks. Please do not strain to pass bowel movements, if you are having trouble, please take a stool softener to ease the passage.     For mild to moderate pain, you may take tylenol and ibuprofen over the counter, alternating between the two every 3 hours.    Please follow up with Dr. Shepard in 1-2 weeks. If you have any questions, you may call the office.

## 2025-06-25 DIAGNOSIS — K80.00 CALCULUS OF GALLBLADDER WITH ACUTE CHOLECYSTITIS WITHOUT OBSTRUCTION: ICD-10-CM

## 2025-06-25 DIAGNOSIS — Z98.84 BARIATRIC SURGERY STATUS: ICD-10-CM

## 2025-06-25 DIAGNOSIS — E87.5 HYPERKALEMIA: ICD-10-CM

## 2025-07-24 ENCOUNTER — APPOINTMENT (OUTPATIENT)
Dept: ORTHOPEDIC SURGERY | Facility: CLINIC | Age: 55
End: 2025-07-24
Payer: COMMERCIAL

## 2025-07-24 ENCOUNTER — RESULT CHARGE (OUTPATIENT)
Age: 55
End: 2025-07-24

## 2025-07-24 VITALS — BODY MASS INDEX: 35.36 KG/M2 | WEIGHT: 220 LBS | HEIGHT: 66 IN

## 2025-07-24 PROBLEM — Z90.3 ACQUIRED ABSENCE OF STOMACH [PART OF]: Chronic | Status: ACTIVE | Noted: 2025-06-19

## 2025-07-24 PROBLEM — Z87.19 PERSONAL HISTORY OF OTHER DISEASES OF THE DIGESTIVE SYSTEM: Chronic | Status: ACTIVE | Noted: 2025-06-19

## 2025-07-24 PROCEDURE — 99203 OFFICE O/P NEW LOW 30 MIN: CPT

## 2025-07-24 PROCEDURE — 73562 X-RAY EXAM OF KNEE 3: CPT | Mod: LT

## 2025-08-05 ENCOUNTER — RESULT REVIEW (OUTPATIENT)
Age: 55
End: 2025-08-05

## 2025-08-05 ENCOUNTER — OUTPATIENT (OUTPATIENT)
Dept: OUTPATIENT SERVICES | Facility: HOSPITAL | Age: 55
LOS: 1 days | End: 2025-08-05
Payer: COMMERCIAL

## 2025-08-05 DIAGNOSIS — Z90.3 ACQUIRED ABSENCE OF STOMACH [PART OF]: Chronic | ICD-10-CM

## 2025-08-05 DIAGNOSIS — Z00.8 ENCOUNTER FOR OTHER GENERAL EXAMINATION: ICD-10-CM

## 2025-08-05 DIAGNOSIS — M25.562 PAIN IN LEFT KNEE: ICD-10-CM

## 2025-08-05 PROCEDURE — 73721 MRI JNT OF LWR EXTRE W/O DYE: CPT | Mod: 26,LT

## 2025-08-05 PROCEDURE — 73721 MRI JNT OF LWR EXTRE W/O DYE: CPT | Mod: LT

## 2025-08-06 ENCOUNTER — NON-APPOINTMENT (OUTPATIENT)
Age: 55
End: 2025-08-06

## 2025-08-06 DIAGNOSIS — M25.562 PAIN IN LEFT KNEE: ICD-10-CM
